# Patient Record
Sex: MALE | Race: WHITE | NOT HISPANIC OR LATINO | URBAN - METROPOLITAN AREA
[De-identification: names, ages, dates, MRNs, and addresses within clinical notes are randomized per-mention and may not be internally consistent; named-entity substitution may affect disease eponyms.]

---

## 2017-07-18 ENCOUNTER — OUTPATIENT (OUTPATIENT)
Dept: OUTPATIENT SERVICES | Facility: HOSPITAL | Age: 72
LOS: 1 days | End: 2017-07-18

## 2017-07-18 ENCOUNTER — INPATIENT (INPATIENT)
Facility: HOSPITAL | Age: 72
LOS: 1 days | Discharge: HOME | End: 2017-07-20
Payer: MEDICARE

## 2017-07-18 PROCEDURE — 71020: CPT | Mod: 26

## 2017-07-18 PROCEDURE — 99291 CRITICAL CARE FIRST HOUR: CPT

## 2017-07-19 ENCOUNTER — OUTPATIENT (OUTPATIENT)
Dept: OUTPATIENT SERVICES | Facility: HOSPITAL | Age: 72
LOS: 1 days | End: 2017-07-19

## 2017-07-20 ENCOUNTER — OUTPATIENT (OUTPATIENT)
Dept: OUTPATIENT SERVICES | Facility: HOSPITAL | Age: 72
LOS: 1 days | End: 2017-07-20

## 2017-07-20 PROCEDURE — 76700 US EXAM ABDOM COMPLETE: CPT | Mod: 26

## 2019-05-13 PROBLEM — Z00.00 ENCOUNTER FOR PREVENTIVE HEALTH EXAMINATION: Status: ACTIVE | Noted: 2019-05-13

## 2019-05-14 ENCOUNTER — APPOINTMENT (OUTPATIENT)
Dept: RADIOLOGY | Facility: CLINIC | Age: 74
End: 2019-05-14
Payer: MEDICARE

## 2019-05-14 PROCEDURE — 72040 X-RAY EXAM NECK SPINE 2-3 VW: CPT

## 2019-11-27 ENCOUNTER — APPOINTMENT (OUTPATIENT)
Dept: MRI IMAGING | Facility: CLINIC | Age: 74
End: 2019-11-27
Payer: MEDICARE

## 2019-11-27 PROCEDURE — 72148 MRI LUMBAR SPINE W/O DYE: CPT

## 2020-12-10 ENCOUNTER — APPOINTMENT (OUTPATIENT)
Dept: RADIOLOGY | Facility: CLINIC | Age: 75
End: 2020-12-10
Payer: MEDICARE

## 2020-12-10 PROCEDURE — 71046 X-RAY EXAM CHEST 2 VIEWS: CPT

## 2021-05-20 ENCOUNTER — APPOINTMENT (OUTPATIENT)
Dept: RADIOLOGY | Facility: CLINIC | Age: 76
End: 2021-05-20
Payer: MEDICARE

## 2021-05-20 PROCEDURE — 77080 DXA BONE DENSITY AXIAL: CPT

## 2021-06-04 ENCOUNTER — OUTPATIENT (OUTPATIENT)
Dept: OUTPATIENT SERVICES | Facility: HOSPITAL | Age: 76
LOS: 1 days | End: 2021-06-04
Payer: MEDICARE

## 2021-06-04 PROCEDURE — 78306 BONE IMAGING WHOLE BODY: CPT | Mod: 26

## 2021-11-15 ENCOUNTER — OUTPATIENT (OUTPATIENT)
Dept: OUTPATIENT SERVICES | Facility: HOSPITAL | Age: 76
LOS: 1 days | End: 2021-11-15

## 2022-12-15 ENCOUNTER — RX ONLY (RX ONLY)
Age: 77
End: 2022-12-15

## 2022-12-15 ENCOUNTER — OFFICE (OUTPATIENT)
Dept: URBAN - METROPOLITAN AREA CLINIC 38 | Facility: CLINIC | Age: 77
Setting detail: OPHTHALMOLOGY
End: 2022-12-15
Payer: MEDICARE

## 2022-12-15 DIAGNOSIS — H16.223: ICD-10-CM

## 2022-12-15 DIAGNOSIS — H40.1111: ICD-10-CM

## 2022-12-15 DIAGNOSIS — H02.011: ICD-10-CM

## 2022-12-15 DIAGNOSIS — H01.005: ICD-10-CM

## 2022-12-15 DIAGNOSIS — H01.004: ICD-10-CM

## 2022-12-15 DIAGNOSIS — H40.1122: ICD-10-CM

## 2022-12-15 DIAGNOSIS — H25.13: ICD-10-CM

## 2022-12-15 DIAGNOSIS — H01.001: ICD-10-CM

## 2022-12-15 DIAGNOSIS — H01.002: ICD-10-CM

## 2022-12-15 DIAGNOSIS — H02.012: ICD-10-CM

## 2022-12-15 PROCEDURE — 99213 OFFICE O/P EST LOW 20 MIN: CPT | Performed by: OPHTHALMOLOGY

## 2022-12-15 PROCEDURE — 76514 ECHO EXAM OF EYE THICKNESS: CPT | Performed by: OPHTHALMOLOGY

## 2022-12-15 PROCEDURE — 92133 CPTRZD OPH DX IMG PST SGM ON: CPT | Performed by: OPHTHALMOLOGY

## 2022-12-15 PROCEDURE — 67820 REVISE EYELASHES: CPT | Performed by: OPHTHALMOLOGY

## 2022-12-15 PROCEDURE — 92083 EXTENDED VISUAL FIELD XM: CPT | Performed by: OPHTHALMOLOGY

## 2022-12-15 ASSESSMENT — AXIALLENGTH_DERIVED
OD_AL: 23.6995
OD_AL: 23.6995
OS_AL: 23.9006
OS_AL: 23.9006
OD_AL: 23.7487
OS_AL: 23.9006

## 2022-12-15 ASSESSMENT — VISUAL ACUITY
OS_BCVA: 20/25-
OD_BCVA: 20/30-2

## 2022-12-15 ASSESSMENT — REFRACTION_AUTOREFRACTION
OS_AXIS: 069
OS_CYLINDER: -1.00
OD_SPHERE: +1.75
OD_AXIS: 096
OD_CYLINDER: -1.00
OS_SPHERE: +1.25

## 2022-12-15 ASSESSMENT — REFRACTION_MANIFEST
OS_VA2: 20/25(J1)
OS_SPHERE: +1.25
OU_VA: 20/20-1
OD_VA2: 20/25(J1)
OD_AXIS: 100
OS_SPHERE: +1.25
OD_VA1: 20/25-2
OU_VA: 20/20-1
OS_AXIS: 070
OS_CYLINDER: -1.00
OD_CYLINDER: -0.75
OD_VA2: 20/25(J1)
OS_VA1: 20/20-2
OS_VA1: 20/20-2
OS_CYLINDER: -1.00
OD_ADD: +2.50
OD_AXIS: 100
OS_ADD: +2.50
OD_ADD: +2.50
OD_VA1: 20/25-2
OS_AXIS: 070
OD_SPHERE: +1.75
OS_VA2: 20/25(J1)
OS_ADD: +2.50
OD_CYLINDER: -0.75
OD_SPHERE: +1.75

## 2022-12-15 ASSESSMENT — LID EXAM ASSESSMENTS
OD_TRICHIASIS: RLL RUL
OS_BLEPHARITIS: LLL LUL
OD_BLEPHARITIS: RLL RUL

## 2022-12-15 ASSESSMENT — REFRACTION_CURRENTRX
OS_VPRISM_DIRECTION: PROGS
OD_SPHERE: +1.75
OS_OVR_VA: 20/
OS_SPHERE: +1.25
OD_CYLINDER: -1.00
OS_AXIS: 084
OD_VPRISM_DIRECTION: PROGS
OD_AXIS: 094
OS_ADD: +2.50
OD_ADD: +2.50
OS_CYLINDER: -0.75
OD_OVR_VA: 20/

## 2022-12-15 ASSESSMENT — PACHYMETRY
OD_CT_CORRECTION: 5
OS_CT_UM: 476
OD_CT_UM: 477
OS_CT_CORRECTION: 5

## 2022-12-15 ASSESSMENT — KERATOMETRY
OD_K2POWER_DIOPTERS: 41.75
OS_AXISANGLE_DEGREES: 114
OD_AXISANGLE_DEGREES: 090
OS_K1POWER_DIOPTERS: 41.75
OD_K1POWER_DIOPTERS: 41.75
OS_K2POWER_DIOPTERS: 42.00

## 2022-12-15 ASSESSMENT — SPHEQUIV_DERIVED
OS_SPHEQUIV: 0.75
OS_SPHEQUIV: 0.75
OD_SPHEQUIV: 1.25
OD_SPHEQUIV: 1.375
OS_SPHEQUIV: 0.75
OD_SPHEQUIV: 1.375

## 2022-12-15 ASSESSMENT — DRY EYES - PHYSICIAN NOTES
OS_GENERALCOMMENTS: INFERIORLY
OD_GENERALCOMMENTS: INFERIORLY

## 2022-12-15 ASSESSMENT — CONFRONTATIONAL VISUAL FIELD TEST (CVF)
OS_FINDINGS: FULL
OD_FINDINGS: FULL

## 2022-12-15 ASSESSMENT — SUPERFICIAL PUNCTATE KERATITIS (SPK)
OS_SPK: 1+
OD_SPK: 1+

## 2022-12-15 ASSESSMENT — TONOMETRY: OD_IOP_MMHG: 19

## 2022-12-22 ENCOUNTER — OFFICE (OUTPATIENT)
Dept: URBAN - METROPOLITAN AREA CLINIC 38 | Facility: CLINIC | Age: 77
Setting detail: OPHTHALMOLOGY
End: 2022-12-22
Payer: MEDICARE

## 2022-12-22 DIAGNOSIS — H40.1122: ICD-10-CM

## 2022-12-22 DIAGNOSIS — H40.1111: ICD-10-CM

## 2022-12-22 PROBLEM — H01.001 BLEPHARITIS; RIGHT UPPER LID, RIGHT LOWER LID, LEFT UPPER LID, LEFT LOWER LID: Status: ACTIVE | Noted: 2022-12-22

## 2022-12-22 PROBLEM — H01.005 BLEPHARITIS; RIGHT UPPER LID, RIGHT LOWER LID, LEFT UPPER LID, LEFT LOWER LID: Status: ACTIVE | Noted: 2022-12-22

## 2022-12-22 PROBLEM — H01.002 BLEPHARITIS; RIGHT UPPER LID, RIGHT LOWER LID, LEFT UPPER LID, LEFT LOWER LID: Status: ACTIVE | Noted: 2022-12-22

## 2022-12-22 PROBLEM — H01.004 BLEPHARITIS; RIGHT UPPER LID, RIGHT LOWER LID, LEFT UPPER LID, LEFT LOWER LID: Status: ACTIVE | Noted: 2022-12-22

## 2022-12-22 PROBLEM — H02.011: Status: ACTIVE | Noted: 2022-12-15

## 2022-12-22 PROBLEM — H02.012: Status: ACTIVE | Noted: 2022-12-15

## 2022-12-22 PROCEDURE — 99213 OFFICE O/P EST LOW 20 MIN: CPT | Performed by: OPHTHALMOLOGY

## 2022-12-22 ASSESSMENT — REFRACTION_MANIFEST
OS_SPHERE: +1.25
OD_VA2: 20/25(J1)
OD_ADD: +2.50
OS_CYLINDER: -1.00
OS_VA2: 20/25(J1)
OS_CYLINDER: -1.00
OS_ADD: +2.50
OS_SPHERE: +1.25
OU_VA: 20/20-1
OD_VA1: 20/25-2
OD_AXIS: 100
OD_VA2: 20/25(J1)
OD_SPHERE: +1.75
OS_AXIS: 070
OS_VA1: 20/20-2
OD_AXIS: 100
OD_VA1: 20/25-2
OD_ADD: +2.50
OS_VA2: 20/25(J1)
OD_CYLINDER: -0.75
OS_ADD: +2.50
OS_AXIS: 070
OD_SPHERE: +1.75
OS_VA1: 20/20-2
OD_CYLINDER: -0.75
OU_VA: 20/20-1

## 2022-12-22 ASSESSMENT — LID EXAM ASSESSMENTS
OD_TRICHIASIS: RLL RUL
OS_BLEPHARITIS: LLL LUL
OD_BLEPHARITIS: RLL RUL

## 2022-12-22 ASSESSMENT — PACHYMETRY
OD_CT_UM: 477
OD_CT_CORRECTION: 5
OS_CT_UM: 476
OS_CT_CORRECTION: 5

## 2022-12-22 ASSESSMENT — REFRACTION_CURRENTRX
OD_VPRISM_DIRECTION: PROGS
OD_AXIS: 094
OS_SPHERE: +1.25
OD_ADD: +2.50
OS_CYLINDER: -0.75
OS_OVR_VA: 20/
OD_CYLINDER: -1.00
OS_ADD: +2.50
OD_OVR_VA: 20/
OD_SPHERE: +1.75
OS_AXIS: 084
OS_VPRISM_DIRECTION: PROGS

## 2022-12-22 ASSESSMENT — KERATOMETRY
METHOD_AUTO_MANUAL: AUTO
OD_AXISANGLE_DEGREES: 31
OS_AXISANGLE_DEGREES: 90
OS_K2POWER_DIOPTERS: 41.75
OD_K2POWER_DIOPTERS: 42.00
OS_K1POWER_DIOPTERS: 41.75
OD_K1POWER_DIOPTERS: 41.50

## 2022-12-22 ASSESSMENT — REFRACTION_AUTOREFRACTION
OD_SPHERE: +1.50
OS_CYLINDER: -0.75
OS_AXIS: 79
OS_SPHERE: +1.00
OD_CYLINDER: -1.00
OD_AXIS: 99

## 2022-12-22 ASSESSMENT — SPHEQUIV_DERIVED
OS_SPHEQUIV: 0.625
OD_SPHEQUIV: 1.375
OD_SPHEQUIV: 1
OD_SPHEQUIV: 1.375
OS_SPHEQUIV: 0.75
OS_SPHEQUIV: 0.75

## 2022-12-22 ASSESSMENT — AXIALLENGTH_DERIVED
OS_AL: 23.95
OD_AL: 23.8478
OS_AL: 23.95
OD_AL: 23.6995
OS_AL: 24
OD_AL: 23.6995

## 2022-12-22 ASSESSMENT — TONOMETRY
OD_IOP_MMHG: 12
OS_IOP_MMHG: 15

## 2022-12-22 ASSESSMENT — DRY EYES - PHYSICIAN NOTES
OD_GENERALCOMMENTS: INFERIORLY
OS_GENERALCOMMENTS: INFERIORLY

## 2022-12-22 ASSESSMENT — CONFRONTATIONAL VISUAL FIELD TEST (CVF)
OD_FINDINGS: FULL
OS_FINDINGS: FULL

## 2022-12-22 ASSESSMENT — SUPERFICIAL PUNCTATE KERATITIS (SPK)
OD_SPK: 1+
OS_SPK: 1+

## 2022-12-22 ASSESSMENT — VISUAL ACUITY
OS_BCVA: 20/25-
OD_BCVA: 20/30-2

## 2023-05-04 ENCOUNTER — RX ONLY (RX ONLY)
Age: 78
End: 2023-05-04

## 2023-05-04 ENCOUNTER — OFFICE (OUTPATIENT)
Dept: URBAN - METROPOLITAN AREA CLINIC 38 | Facility: CLINIC | Age: 78
Setting detail: OPHTHALMOLOGY
End: 2023-05-04
Payer: MEDICARE

## 2023-05-04 DIAGNOSIS — H40.1111: ICD-10-CM

## 2023-05-04 DIAGNOSIS — H02.011: ICD-10-CM

## 2023-05-04 DIAGNOSIS — H01.005: ICD-10-CM

## 2023-05-04 DIAGNOSIS — H40.1122: ICD-10-CM

## 2023-05-04 DIAGNOSIS — H25.13: ICD-10-CM

## 2023-05-04 DIAGNOSIS — H01.002: ICD-10-CM

## 2023-05-04 DIAGNOSIS — H02.012: ICD-10-CM

## 2023-05-04 DIAGNOSIS — H01.001: ICD-10-CM

## 2023-05-04 DIAGNOSIS — H16.223: ICD-10-CM

## 2023-05-04 DIAGNOSIS — H01.004: ICD-10-CM

## 2023-05-04 PROCEDURE — 92133 CPTRZD OPH DX IMG PST SGM ON: CPT | Performed by: OPHTHALMOLOGY

## 2023-05-04 PROCEDURE — 99213 OFFICE O/P EST LOW 20 MIN: CPT | Performed by: OPHTHALMOLOGY

## 2023-05-04 ASSESSMENT — REFRACTION_CURRENTRX
OD_CYLINDER: -1.00
OD_OVR_VA: 20/
OS_AXIS: 084
OS_CYLINDER: -0.75
OD_AXIS: 094
OS_ADD: +2.50
OS_OVR_VA: 20/
OD_SPHERE: +1.75
OS_VPRISM_DIRECTION: PROGS
OD_ADD: +2.50
OS_SPHERE: +1.25
OD_VPRISM_DIRECTION: PROGS

## 2023-05-04 ASSESSMENT — REFRACTION_MANIFEST
OD_VA1: 20/25-2
OD_CYLINDER: -0.75
OD_SPHERE: +1.25
OS_SPHERE: +1.25
OS_SPHERE: +0.50
OD_ADD: +2.75
OS_VA1: 20/25
OD_AXIS: 095
OU_VA: 20/20-1
OD_VA2: 20/25(J1)
OD_ADD: +2.50
OS_AXIS: 070
OD_VA2: 20/25(J1)
OD_SPHERE: +1.75
OS_AXIS: 070
OS_VA2: 20/25(J1)
OD_AXIS: 100
OD_VA1: 20/25-2
OD_CYLINDER: +1.00
OS_CYLINDER: -1.00
OS_ADD: +2.50
OS_ADD: +2.75
OU_VA: 20/20-1
OS_VA1: 20/20-2
OS_CYLINDER: -1.00
OS_VA2: 20/25(J1)

## 2023-05-04 ASSESSMENT — KERATOMETRY
METHOD_AUTO_MANUAL: AUTO
OD_K2POWER_DIOPTERS: 42.00
OD_AXISANGLE_DEGREES: 090
OS_AXISANGLE_DEGREES: 124
OS_K1POWER_DIOPTERS: 41.75
OS_K2POWER_DIOPTERS: 42.00
OD_K1POWER_DIOPTERS: 42.00

## 2023-05-04 ASSESSMENT — AXIALLENGTH_DERIVED
OD_AL: 23.4618
OD_AL: 23.7544
OS_AL: 24.2043
OS_AL: 23.9006
OS_AL: 24.1022
OD_AL: 23.6072

## 2023-05-04 ASSESSMENT — TONOMETRY
OS_IOP_MMHG: 15
OD_IOP_MMHG: 15

## 2023-05-04 ASSESSMENT — REFRACTION_AUTOREFRACTION
OD_SPHERE: +1.75
OS_AXIS: 067
OS_CYLINDER: -1.00
OD_CYLINDER: -1.50
OS_SPHERE: +0.75
OD_AXIS: 095

## 2023-05-04 ASSESSMENT — CONFRONTATIONAL VISUAL FIELD TEST (CVF)
OD_FINDINGS: FULL
OS_FINDINGS: FULL

## 2023-05-04 ASSESSMENT — SPHEQUIV_DERIVED
OS_SPHEQUIV: 0
OS_SPHEQUIV: 0.25
OS_SPHEQUIV: 0.75
OD_SPHEQUIV: 1.375
OD_SPHEQUIV: 1
OD_SPHEQUIV: 1.75

## 2023-05-04 ASSESSMENT — PACHYMETRY
OS_CT_CORRECTION: 5
OD_CT_UM: 477
OD_CT_CORRECTION: 5
OS_CT_UM: 476

## 2023-05-04 ASSESSMENT — VISUAL ACUITY
OS_BCVA: 20/25-1
OD_BCVA: 20/40

## 2023-05-04 ASSESSMENT — LID EXAM ASSESSMENTS
OS_BLEPHARITIS: LLL LUL T
OD_TRICHIASIS: RLL RUL
OD_BLEPHARITIS: RLL RUL T

## 2023-05-04 ASSESSMENT — SUPERFICIAL PUNCTATE KERATITIS (SPK)
OS_SPK: T
OD_SPK: T

## 2023-05-04 ASSESSMENT — TEAR BREAK UP TIME (TBUT)
OS_TBUT: 6-8 SEC
OD_TBUT: 6-8 SEC

## 2023-05-22 ENCOUNTER — APPOINTMENT (OUTPATIENT)
Dept: ORTHOPEDIC SURGERY | Facility: CLINIC | Age: 78
End: 2023-05-22
Payer: MEDICARE

## 2023-05-22 VITALS — BODY MASS INDEX: 25.9 KG/M2 | WEIGHT: 185 LBS | HEIGHT: 71 IN

## 2023-05-22 DIAGNOSIS — E78.00 PURE HYPERCHOLESTEROLEMIA, UNSPECIFIED: ICD-10-CM

## 2023-05-22 DIAGNOSIS — M51.37 OTHER INTERVERTEBRAL DISC DEGENERATION, LUMBOSACRAL REGION: ICD-10-CM

## 2023-05-22 DIAGNOSIS — Z85.46 PERSONAL HISTORY OF MALIGNANT NEOPLASM OF PROSTATE: ICD-10-CM

## 2023-05-22 DIAGNOSIS — Z78.9 OTHER SPECIFIED HEALTH STATUS: ICD-10-CM

## 2023-05-22 DIAGNOSIS — M41.25 OTHER IDIOPATHIC SCOLIOSIS, THORACOLUMBAR REGION: ICD-10-CM

## 2023-05-22 DIAGNOSIS — I10 ESSENTIAL (PRIMARY) HYPERTENSION: ICD-10-CM

## 2023-05-22 DIAGNOSIS — M51.36 OTHER INTERVERTEBRAL DISC DEGENERATION, LUMBAR REGION: ICD-10-CM

## 2023-05-22 DIAGNOSIS — H57.9 UNSPECIFIED DISORDER OF EYE AND ADNEXA: ICD-10-CM

## 2023-05-22 DIAGNOSIS — Z87.39 PERSONAL HISTORY OF OTHER DISEASES OF THE MUSCULOSKELETAL SYSTEM AND CONNECTIVE TISSUE: ICD-10-CM

## 2023-05-22 DIAGNOSIS — M47.817 SPONDYLOSIS W/OUT MYELOPATHY OR RADICULOPATHY, LUMBOSACRAL REGION: ICD-10-CM

## 2023-05-22 DIAGNOSIS — M48.061 SPINAL STENOSIS, LUMBAR REGION WITHOUT NEUROGENIC CLAUDICATION: ICD-10-CM

## 2023-05-22 PROCEDURE — 99204 OFFICE O/P NEW MOD 45 MIN: CPT

## 2023-05-22 PROCEDURE — 72100 X-RAY EXAM L-S SPINE 2/3 VWS: CPT

## 2023-05-22 RX ORDER — SIMVASTATIN 20 MG/1
20 TABLET, FILM COATED ORAL
Refills: 0 | Status: ACTIVE | COMMUNITY

## 2023-05-22 RX ORDER — ASPIRIN 81 MG
81 TABLET, DELAYED RELEASE (ENTERIC COATED) ORAL
Refills: 0 | Status: ACTIVE | COMMUNITY

## 2023-05-22 RX ORDER — ENALAPRIL MALEATE 20 MG/1
20 TABLET ORAL
Refills: 0 | Status: ACTIVE | COMMUNITY

## 2023-05-22 RX ORDER — METOPROLOL TARTRATE 100 MG/1
100 TABLET, FILM COATED ORAL
Refills: 0 | Status: ACTIVE | COMMUNITY

## 2023-05-22 RX ORDER — AMLODIPINE BESYLATE 5 MG/1
5 TABLET ORAL
Refills: 0 | Status: ACTIVE | COMMUNITY

## 2023-05-22 RX ORDER — MELOXICAM 15 MG/1
15 TABLET ORAL
Refills: 0 | Status: ACTIVE | COMMUNITY

## 2023-05-22 RX ORDER — LATANOPROST/PF 0.005 %
0.01 DROPS OPHTHALMIC (EYE)
Refills: 0 | Status: ACTIVE | COMMUNITY

## 2023-05-22 NOTE — HISTORY OF PRESENT ILLNESS
[Mid-back] : mid-back [Gradual] : gradual [7] : 7 [2] : 2 [Dull/Aching] : dull/aching [Sharp] : sharp [Intermittent] : intermittent [Nothing helps with pain getting better] : Nothing helps with pain getting better [Bending forward] : bending forward [Retired] : Work status: retired [de-identified] : Patient presents today with middle back pain for years with NKI. Previously completed PT ~ 40 years ago. States his pain is localized to the left side. Admits to taking Aleve PRN for pain. Admits to taking Meloxicam for his left knee. Had lumbar spine xray in [place], has report but no disc. \par \par Patient is scheduled for L TKA 8/7/23. [] : no [de-identified] : gardening, lifting

## 2023-05-22 NOTE — IMAGING
[Facet arthropathy] : Facet arthropathy [Disc space narrowing] : Disc space narrowing [Scoliosis] : Scoliosis

## 2023-06-08 ENCOUNTER — APPOINTMENT (OUTPATIENT)
Dept: MRI IMAGING | Facility: CLINIC | Age: 78
End: 2023-06-08

## 2023-06-19 ENCOUNTER — APPOINTMENT (OUTPATIENT)
Dept: ORTHOPEDIC SURGERY | Facility: CLINIC | Age: 78
End: 2023-06-19

## 2023-11-09 ENCOUNTER — OFFICE (OUTPATIENT)
Dept: URBAN - METROPOLITAN AREA CLINIC 38 | Facility: CLINIC | Age: 78
Setting detail: OPHTHALMOLOGY
End: 2023-11-09
Payer: MEDICARE

## 2023-11-09 DIAGNOSIS — H01.001: ICD-10-CM

## 2023-11-09 DIAGNOSIS — H02.011: ICD-10-CM

## 2023-11-09 DIAGNOSIS — H40.1122: ICD-10-CM

## 2023-11-09 DIAGNOSIS — H01.005: ICD-10-CM

## 2023-11-09 DIAGNOSIS — H25.13: ICD-10-CM

## 2023-11-09 DIAGNOSIS — H01.004: ICD-10-CM

## 2023-11-09 DIAGNOSIS — H16.223: ICD-10-CM

## 2023-11-09 DIAGNOSIS — H01.002: ICD-10-CM

## 2023-11-09 DIAGNOSIS — H40.1111: ICD-10-CM

## 2023-11-09 DIAGNOSIS — H02.012: ICD-10-CM

## 2023-11-09 PROCEDURE — 92133 CPTRZD OPH DX IMG PST SGM ON: CPT | Performed by: OPHTHALMOLOGY

## 2023-11-09 PROCEDURE — 99213 OFFICE O/P EST LOW 20 MIN: CPT | Performed by: OPHTHALMOLOGY

## 2023-11-09 ASSESSMENT — LID EXAM ASSESSMENTS
OS_TRICHIASIS: ABSENT
OS_BLEPHARITIS: LLL LUL T
OD_TRICHIASIS: ABSENT
OD_BLEPHARITIS: RLL RUL T

## 2023-11-09 ASSESSMENT — SUPERFICIAL PUNCTATE KERATITIS (SPK)
OS_SPK: T
OD_SPK: T

## 2023-11-09 ASSESSMENT — TEAR BREAK UP TIME (TBUT)
OD_TBUT: 6-8 SEC
OS_TBUT: 6-8 SEC

## 2023-11-09 ASSESSMENT — CONFRONTATIONAL VISUAL FIELD TEST (CVF)
OD_FINDINGS: FULL
OS_FINDINGS: FULL

## 2023-11-14 PROBLEM — H01.001 BLEPHARITIS; RIGHT UPPER LID, RIGHT LOWER LID, LEFT UPPER LID, LEFT LOWER LID: Status: ACTIVE | Noted: 2023-11-09

## 2023-11-14 PROBLEM — H01.004 BLEPHARITIS; RIGHT UPPER LID, RIGHT LOWER LID, LEFT UPPER LID, LEFT LOWER LID: Status: ACTIVE | Noted: 2023-11-09

## 2023-11-14 PROBLEM — H01.005 BLEPHARITIS; RIGHT UPPER LID, RIGHT LOWER LID, LEFT UPPER LID, LEFT LOWER LID: Status: ACTIVE | Noted: 2023-11-09

## 2023-11-14 PROBLEM — H01.002 BLEPHARITIS; RIGHT UPPER LID, RIGHT LOWER LID, LEFT UPPER LID, LEFT LOWER LID: Status: ACTIVE | Noted: 2023-11-09

## 2023-11-14 ASSESSMENT — REFRACTION_MANIFEST
OS_VA2: 20/25(J1)
OS_CYLINDER: -1.00
OS_VA1: 20/20-2
OD_SPHERE: +1.25
OS_SPHERE: +1.25
OS_AXIS: 070
OS_CYLINDER: -1.00
OD_VA2: 20/25(J1)
OU_VA: 20/20-1
OD_CYLINDER: -0.75
OS_ADD: +2.75
OS_ADD: +2.50
OD_VA1: 20/25-2
OD_SPHERE: +1.75
OD_ADD: +2.75
OS_VA1: 20/25
OD_CYLINDER: +1.00
OD_AXIS: 095
OS_AXIS: 070
OD_ADD: +2.50
OS_SPHERE: +0.50
OD_VA2: 20/25(J1)
OS_VA2: 20/25(J1)
OU_VA: 20/20-1
OD_AXIS: 100
OD_VA1: 20/25-2

## 2023-11-14 ASSESSMENT — REFRACTION_CURRENTRX
OD_AXIS: 094
OS_SPHERE: +1.25
OD_ADD: +2.50
OS_AXIS: 084
OS_ADD: +2.50
OD_SPHERE: +1.75
OD_CYLINDER: -1.00
OS_VPRISM_DIRECTION: PROGS
OS_CYLINDER: -0.75
OD_OVR_VA: 20/
OS_OVR_VA: 20/
OD_VPRISM_DIRECTION: PROGS

## 2023-11-14 ASSESSMENT — SPHEQUIV_DERIVED
OS_SPHEQUIV: 0.75
OD_SPHEQUIV: 1
OD_SPHEQUIV: 1.75
OS_SPHEQUIV: 0
OS_SPHEQUIV: 0.25
OD_SPHEQUIV: 1.375

## 2023-11-14 ASSESSMENT — REFRACTION_AUTOREFRACTION
OS_SPHERE: +0.75
OD_AXIS: 095
OS_CYLINDER: -1.00
OD_SPHERE: +1.75
OS_AXIS: 067
OD_CYLINDER: -1.50

## 2024-02-23 DIAGNOSIS — M88.9 OSTEITIS DEFORMANS OF UNSPECIFIED BONE: ICD-10-CM

## 2024-02-23 DIAGNOSIS — M81.0 AGE-RELATED OSTEOPOROSIS W/OUT CURRENT PATHOLOGICAL FRACTURE: ICD-10-CM

## 2024-05-09 ENCOUNTER — OFFICE (OUTPATIENT)
Dept: URBAN - METROPOLITAN AREA CLINIC 38 | Facility: CLINIC | Age: 79
Setting detail: OPHTHALMOLOGY
End: 2024-05-09
Payer: MEDICARE

## 2024-05-09 DIAGNOSIS — H25.13: ICD-10-CM

## 2024-05-09 DIAGNOSIS — H02.011: ICD-10-CM

## 2024-05-09 DIAGNOSIS — H16.223: ICD-10-CM

## 2024-05-09 DIAGNOSIS — H01.001: ICD-10-CM

## 2024-05-09 DIAGNOSIS — H01.005: ICD-10-CM

## 2024-05-09 DIAGNOSIS — H40.1111: ICD-10-CM

## 2024-05-09 DIAGNOSIS — H01.002: ICD-10-CM

## 2024-05-09 DIAGNOSIS — H02.012: ICD-10-CM

## 2024-05-09 DIAGNOSIS — H01.004: ICD-10-CM

## 2024-05-09 DIAGNOSIS — H40.1122: ICD-10-CM

## 2024-05-09 PROCEDURE — 92133 CPTRZD OPH DX IMG PST SGM ON: CPT | Performed by: OPHTHALMOLOGY

## 2024-05-09 PROCEDURE — 92014 COMPRE OPH EXAM EST PT 1/>: CPT | Performed by: OPHTHALMOLOGY

## 2024-05-09 ASSESSMENT — LID EXAM ASSESSMENTS
OS_TRICHIASIS: ABSENT
OD_BLEPHARITIS: RLL RUL T
OD_TRICHIASIS: T
OS_BLEPHARITIS: LLL LUL T

## 2024-05-09 ASSESSMENT — CONFRONTATIONAL VISUAL FIELD TEST (CVF)
OD_FINDINGS: FULL
OS_FINDINGS: FULL

## 2024-05-10 PROBLEM — H01.001 BLEPHARITIS; RIGHT UPPER LID, LEFT UPPER LID: Status: ACTIVE | Noted: 2024-05-09

## 2024-05-10 PROBLEM — H01.004 BLEPHARITIS; RIGHT UPPER LID, LEFT UPPER LID: Status: ACTIVE | Noted: 2024-05-09

## 2024-10-17 ENCOUNTER — APPOINTMENT (OUTPATIENT)
Dept: ENDOCRINOLOGY | Facility: CLINIC | Age: 79
End: 2024-10-17
Payer: MEDICARE

## 2024-10-17 VITALS
DIASTOLIC BLOOD PRESSURE: 72 MMHG | HEIGHT: 71 IN | SYSTOLIC BLOOD PRESSURE: 126 MMHG | WEIGHT: 182 LBS | HEART RATE: 67 BPM | TEMPERATURE: 98 F | OXYGEN SATURATION: 97 % | BODY MASS INDEX: 25.48 KG/M2

## 2024-10-17 DIAGNOSIS — M88.9 OSTEITIS DEFORMANS OF UNSPECIFIED BONE: ICD-10-CM

## 2024-10-17 DIAGNOSIS — M81.0 AGE-RELATED OSTEOPOROSIS W/OUT CURRENT PATHOLOGICAL FRACTURE: ICD-10-CM

## 2024-10-17 DIAGNOSIS — M51.369: ICD-10-CM

## 2024-10-17 DIAGNOSIS — M48.061 SPINAL STENOSIS, LUMBAR REGION WITHOUT NEUROGENIC CLAUDICATION: ICD-10-CM

## 2024-10-17 PROCEDURE — 99204 OFFICE O/P NEW MOD 45 MIN: CPT

## 2024-10-17 PROCEDURE — 99214 OFFICE O/P EST MOD 30 MIN: CPT

## 2024-10-17 PROCEDURE — G2211 COMPLEX E/M VISIT ADD ON: CPT

## 2024-10-24 ENCOUNTER — APPOINTMENT (OUTPATIENT)
Dept: RADIOLOGY | Facility: CLINIC | Age: 79
End: 2024-10-24
Payer: MEDICARE

## 2024-10-24 PROCEDURE — 77080 DXA BONE DENSITY AXIAL: CPT

## 2024-11-21 ENCOUNTER — OFFICE (OUTPATIENT)
Dept: URBAN - METROPOLITAN AREA CLINIC 38 | Facility: CLINIC | Age: 79
Setting detail: OPHTHALMOLOGY
End: 2024-11-21
Payer: MEDICARE

## 2024-11-21 DIAGNOSIS — H35.372: ICD-10-CM

## 2024-11-21 DIAGNOSIS — H40.1111: ICD-10-CM

## 2024-11-21 DIAGNOSIS — H25.13: ICD-10-CM

## 2024-11-21 DIAGNOSIS — H40.1122: ICD-10-CM

## 2024-11-21 PROCEDURE — 92083 EXTENDED VISUAL FIELD XM: CPT | Performed by: OPHTHALMOLOGY

## 2024-11-21 PROCEDURE — 92014 COMPRE OPH EXAM EST PT 1/>: CPT | Performed by: OPHTHALMOLOGY

## 2024-11-21 PROCEDURE — 92134 CPTRZ OPH DX IMG PST SGM RTA: CPT | Performed by: OPHTHALMOLOGY

## 2024-11-21 ASSESSMENT — REFRACTION_MANIFEST
OS_AXIS: 065
OD_AXIS: 090
OD_CYLINDER: -1.00
OU_VA: 20/20-1
OD_SPHERE: +1.25
OD_AXIS: 095
OS_ADD: +2.50
OD_CYLINDER: +1.00
OS_VA1: 20/125
OS_CYLINDER: -1.00
OS_AXIS: 070
OS_VA2: 20/25(J1)
OS_VA1: 20/25
OS_SPHERE: +0.50
OS_VA2: 20/25(J1)
OS_CYLINDER: -1.00
OS_SPHERE: +0.75
OD_VA2: 20/25(J1)
OD_ADD: +2.50
OD_VA1: 20/30-2
OD_VA2: 20/25(J1)
OD_SPHERE: +1.25
OS_ADD: +2.75
OU_VA: 20/30-
OD_ADD: +2.75
OD_VA1: 20/25-2

## 2024-11-21 ASSESSMENT — TONOMETRY
OS_IOP_MMHG: 21
OD_IOP_MMHG: 16

## 2024-11-21 ASSESSMENT — REFRACTION_CURRENTRX
OD_CYLINDER: -1.00
OD_AXIS: 090
OS_ADD: +2.50
OS_CYLINDER: -1.00
OS_SPHERE: +0.75
OS_OVR_VA: 20/
OD_VPRISM_DIRECTION: PROGS
OD_OVR_VA: 20/
OS_AXIS: 063
OS_VPRISM_DIRECTION: PROGS
OD_ADD: +2.50
OD_SPHERE: +1.25

## 2024-11-21 ASSESSMENT — KERATOMETRY
OD_AXISANGLE_DEGREES: 090
METHOD_AUTO_MANUAL: AUTO
OS_AXISANGLE_DEGREES: 124
OD_K2POWER_DIOPTERS: 42.00
OD_K1POWER_DIOPTERS: 42.00
OS_K2POWER_DIOPTERS: 42.00
OS_K1POWER_DIOPTERS: 41.75

## 2024-11-21 ASSESSMENT — CONFRONTATIONAL VISUAL FIELD TEST (CVF)
OS_FINDINGS: FULL
OD_FINDINGS: FULL

## 2024-11-21 ASSESSMENT — REFRACTION_AUTOREFRACTION
OS_CYLINDER: -1.00
OD_CYLINDER: -1.50
OS_SPHERE: +0.75
OD_SPHERE: +1.75
OD_AXIS: 095
OS_AXIS: 067

## 2024-11-21 ASSESSMENT — PACHYMETRY
OD_CT_UM: 477
OS_CT_CORRECTION: 5
OS_CT_UM: 476
OD_CT_CORRECTION: 5

## 2024-11-21 ASSESSMENT — VISUAL ACUITY
OD_BCVA: 20/125
OS_BCVA: 20/30-2

## 2025-05-01 ENCOUNTER — OFFICE (OUTPATIENT)
Dept: URBAN - METROPOLITAN AREA CLINIC 38 | Facility: CLINIC | Age: 80
Setting detail: OPHTHALMOLOGY
End: 2025-05-01
Payer: MEDICARE

## 2025-05-01 DIAGNOSIS — H25.13: ICD-10-CM

## 2025-05-01 DIAGNOSIS — H25.12: ICD-10-CM

## 2025-05-01 PROCEDURE — 99213 OFFICE O/P EST LOW 20 MIN: CPT | Performed by: OPHTHALMOLOGY

## 2025-05-01 PROCEDURE — 92136 OPHTHALMIC BIOMETRY: CPT | Mod: 26,LT | Performed by: OPHTHALMOLOGY

## 2025-05-01 PROCEDURE — 92136 OPHTHALMIC BIOMETRY: CPT | Mod: TC | Performed by: OPHTHALMOLOGY

## 2025-05-01 ASSESSMENT — KERATOMETRY
OD_K1POWER_DIOPTERS: 41.50
OS_K2POWER_DIOPTERS: 41.75
OS_AXISANGLE_DEGREES: 118
METHOD_AUTO_MANUAL: AUTO
OD_K2POWER_DIOPTERS: 42.25
OS_K1POWER_DIOPTERS: 41.50
OD_AXISANGLE_DEGREES: 004

## 2025-05-01 ASSESSMENT — REFRACTION_CURRENTRX
OS_VPRISM_DIRECTION: PROGS
OD_SPHERE: +1.25
OS_AXIS: 063
OS_CYLINDER: -1.00
OS_SPHERE: +0.75
OD_OVR_VA: 20/
OS_ADD: +2.50
OD_AXIS: 090
OS_OVR_VA: 20/
OD_ADD: +2.50
OD_VPRISM_DIRECTION: PROGS
OD_CYLINDER: -1.00

## 2025-05-01 ASSESSMENT — CONFRONTATIONAL VISUAL FIELD TEST (CVF)
OD_FINDINGS: FULL
OS_FINDINGS: FULL

## 2025-05-01 ASSESSMENT — PACHYMETRY
OS_CT_CORRECTION: 5
OS_CT_UM: 476
OD_CT_UM: 477
OD_CT_CORRECTION: 5

## 2025-05-01 ASSESSMENT — REFRACTION_MANIFEST
OD_SPHERE: +1.25
OS_VA2: 20/25(J1)
OD_CYLINDER: +1.00
OD_VA1: 20/40
OD_VA2: 20/25(J1)
OS_SPHERE: +0.50
OU_VA: 20/20-1
OS_SPHERE: +0.75
OS_AXIS: 065
OS_VA1: 20/25
OD_ADD: +2.75
OD_VA1: 20/25-2
OU_VA: 20/30-
OD_CYLINDER: -1.00
OS_ADD: +2.75
OD_ADD: +2.50
OS_VA2: 20/25(J1)
OD_VA2: 20/25(J1)
OS_ADD: +2.50
OS_AXIS: 070
OD_AXIS: 090
OS_CYLINDER: -1.00
OD_AXIS: 095
OS_VA1: 20/125
OS_CYLINDER: -1.00
OD_SPHERE: +1.25

## 2025-05-01 ASSESSMENT — VISUAL ACUITY
OD_BCVA: 20/125
OS_BCVA: 20/40

## 2025-05-01 ASSESSMENT — REFRACTION_AUTOREFRACTION
OD_SPHERE: +1.00
OS_CYLINDER: -1.25
OS_AXIS: 070
OD_AXIS: 101
OD_CYLINDER: -1.50
OS_SPHERE: -0.75

## 2025-05-01 ASSESSMENT — TONOMETRY
OS_IOP_MMHG: 19
OD_IOP_MMHG: 14

## 2025-05-27 ENCOUNTER — AMBULATORY SURGERY CENTER (OUTPATIENT)
Dept: URBAN - METROPOLITAN AREA SURGERY 4 | Facility: SURGERY | Age: 80
Setting detail: OPHTHALMOLOGY
End: 2025-05-27
Payer: MEDICARE

## 2025-05-27 DIAGNOSIS — H52.222: ICD-10-CM

## 2025-05-27 DIAGNOSIS — H40.1121: ICD-10-CM

## 2025-05-27 DIAGNOSIS — H25.12: ICD-10-CM

## 2025-05-27 PROCEDURE — S9986 NOT MEDICALLY NECESSARY SVC: HCPCS | Mod: GX,GY | Performed by: OPHTHALMOLOGY

## 2025-05-27 PROCEDURE — 66991 XCAPSL CTRC RMVL INSJ 1+: CPT | Mod: LT | Performed by: OPHTHALMOLOGY

## 2025-05-27 PROCEDURE — FEMTO PRECISION LASER CATARACT SURGERY: Mod: GY | Performed by: OPHTHALMOLOGY

## 2025-05-28 ENCOUNTER — RX ONLY (RX ONLY)
Age: 80
End: 2025-05-28

## 2025-05-28 ENCOUNTER — OFFICE (OUTPATIENT)
Dept: URBAN - METROPOLITAN AREA CLINIC 38 | Facility: CLINIC | Age: 80
Setting detail: OPHTHALMOLOGY
End: 2025-05-28
Payer: MEDICARE

## 2025-05-28 DIAGNOSIS — H25.12: ICD-10-CM

## 2025-05-28 PROCEDURE — 99024 POSTOP FOLLOW-UP VISIT: CPT | Performed by: OPHTHALMOLOGY

## 2025-05-28 ASSESSMENT — REFRACTION_MANIFEST
OS_VA1: 20/25
OD_CYLINDER: +1.00
OS_VA2: 20/25(J1)
OD_CYLINDER: -1.00
OD_ADD: +2.50
OS_ADD: +2.50
OS_VA1: 20/125
OD_SPHERE: +1.25
OS_SPHERE: +0.50
OD_SPHERE: +1.25
OD_AXIS: 095
OS_VA2: 20/25(J1)
OU_VA: 20/30-
OS_CYLINDER: -1.00
OD_VA1: 20/40
OD_ADD: +2.75
OS_AXIS: 065
OD_VA2: 20/25(J1)
OS_SPHERE: +0.75
OD_AXIS: 090
OS_AXIS: 070
OD_VA1: 20/25-2
OS_ADD: +2.75
OD_VA2: 20/25(J1)
OU_VA: 20/20-1
OS_CYLINDER: -1.00

## 2025-05-28 ASSESSMENT — PACHYMETRY
OS_CT_CORRECTION: 5
OD_CT_UM: 477
OD_CT_CORRECTION: 5
OS_CT_UM: 476

## 2025-05-28 ASSESSMENT — REFRACTION_AUTOREFRACTION
OD_CYLINDER: -1.50
OS_AXIS: 105
OS_CYLINDER: -0.75
OS_SPHERE: +1.25
OD_SPHERE: +1.00
OD_AXIS: 101

## 2025-05-28 ASSESSMENT — REFRACTION_CURRENTRX
OD_SPHERE: +1.25
OD_OVR_VA: 20/
OD_CYLINDER: -1.00
OS_AXIS: 063
OS_VPRISM_DIRECTION: PROGS
OS_OVR_VA: 20/
OD_AXIS: 090
OS_ADD: +2.50
OD_ADD: +2.50
OS_CYLINDER: -1.00
OD_VPRISM_DIRECTION: PROGS
OS_SPHERE: +0.75

## 2025-05-28 ASSESSMENT — VISUAL ACUITY
OD_BCVA: 20/40-1
OS_BCVA: 20/25

## 2025-05-28 ASSESSMENT — TONOMETRY: OD_IOP_MMHG: 12

## 2025-05-28 ASSESSMENT — CONFRONTATIONAL VISUAL FIELD TEST (CVF)
OS_FINDINGS: FULL
OD_FINDINGS: FULL

## 2025-05-28 ASSESSMENT — KERATOMETRY
OD_AXISANGLE_DEGREES: 025
METHOD_AUTO_MANUAL: AUTO
OD_K2POWER_DIOPTERS: 42.25
OS_K2POWER_DIOPTERS: 41.50
OD_K1POWER_DIOPTERS: 41.25
OS_AXISANGLE_DEGREES: 090
OS_K1POWER_DIOPTERS: 41.50

## 2025-05-28 ASSESSMENT — CORNEAL EDEMA CLINICAL DESCRIPTION: OS_CORNEALEDEMA: T @ INCISION

## 2025-05-29 ENCOUNTER — OFFICE (OUTPATIENT)
Dept: URBAN - METROPOLITAN AREA CLINIC 38 | Facility: CLINIC | Age: 80
Setting detail: OPHTHALMOLOGY
End: 2025-05-29
Payer: MEDICARE

## 2025-05-29 DIAGNOSIS — H25.11: ICD-10-CM

## 2025-05-29 PROBLEM — Z96.1 PSEUDOPHAKIA: Status: ACTIVE | Noted: 2025-05-29

## 2025-05-29 PROCEDURE — 92136 OPHTHALMIC BIOMETRY: CPT | Mod: 26,RT | Performed by: OPHTHALMOLOGY

## 2025-05-29 ASSESSMENT — REFRACTION_AUTOREFRACTION
OS_CYLINDER: -0.50
OD_CYLINDER: -1.75
OD_SPHERE: +1.00
OS_AXIS: 107
OS_SPHERE: +1.00
OD_AXIS: 100

## 2025-05-29 ASSESSMENT — PACHYMETRY
OS_CT_UM: 476
OS_CT_CORRECTION: 5
OD_CT_CORRECTION: 5
OD_CT_UM: 477

## 2025-05-29 ASSESSMENT — KERATOMETRY
OD_K1POWER_DIOPTERS: 42.50
OS_AXISANGLE_DEGREES: 090
OS_K1POWER_DIOPTERS: 41.75
OD_AXISANGLE_DEGREES: 090
OD_K2POWER_DIOPTERS: 42.50
OS_K2POWER_DIOPTERS: 41.75

## 2025-05-29 ASSESSMENT — REFRACTION_MANIFEST
OD_AXIS: 090
OS_CYLINDER: -1.00
OD_CYLINDER: +1.00
OS_AXIS: 065
OS_CYLINDER: -1.00
OS_ADD: +2.50
OD_AXIS: 095
OD_VA1: 20/25-2
OD_CYLINDER: -1.00
OS_SPHERE: +0.75
OD_SPHERE: +1.25
OS_VA2: 20/25(J1)
OU_VA: 20/30-
OD_ADD: +2.50
OU_VA: 20/20-1
OS_ADD: +2.75
OS_SPHERE: +0.50
OS_VA2: 20/25(J1)
OD_VA2: 20/25(J1)
OD_ADD: +2.75
OD_VA2: 20/25(J1)
OS_VA1: 20/125
OD_SPHERE: +1.25
OS_AXIS: 070
OS_VA1: 20/25
OD_VA1: 20/40

## 2025-05-29 ASSESSMENT — REFRACTION_CURRENTRX
OS_VPRISM_DIRECTION: PROGS
OD_AXIS: 090
OD_OVR_VA: 20/
OD_CYLINDER: -1.00
OS_ADD: +2.50
OD_VPRISM_DIRECTION: PROGS
OS_AXIS: 063
OS_CYLINDER: -1.00
OS_OVR_VA: 20/
OD_SPHERE: +1.25
OD_ADD: +2.50
OS_SPHERE: +0.75

## 2025-05-29 ASSESSMENT — TONOMETRY
OD_IOP_MMHG: 15
OS_IOP_MMHG: 14

## 2025-05-29 ASSESSMENT — CONFRONTATIONAL VISUAL FIELD TEST (CVF)
OD_FINDINGS: FULL
OS_FINDINGS: FULL

## 2025-05-29 ASSESSMENT — VISUAL ACUITY
OD_BCVA: 20/40+2
OS_BCVA: 20/25

## 2025-05-29 ASSESSMENT — CORNEAL EDEMA CLINICAL DESCRIPTION: OS_CORNEALEDEMA: T @ INCISION

## 2025-06-10 ENCOUNTER — AMBULATORY SURGERY CENTER (OUTPATIENT)
Dept: URBAN - METROPOLITAN AREA SURGERY 4 | Facility: SURGERY | Age: 80
Setting detail: OPHTHALMOLOGY
End: 2025-06-10
Payer: MEDICARE

## 2025-06-10 DIAGNOSIS — H40.1111: ICD-10-CM

## 2025-06-10 DIAGNOSIS — H25.11: ICD-10-CM

## 2025-06-10 DIAGNOSIS — H52.221: ICD-10-CM

## 2025-06-10 PROCEDURE — 66991 XCAPSL CTRC RMVL INSJ 1+: CPT | Mod: 79,RT | Performed by: OPHTHALMOLOGY

## 2025-06-10 PROCEDURE — FEMTO PRECISION LASER CATARACT SURGERY: Mod: GY | Performed by: OPHTHALMOLOGY

## 2025-06-10 PROCEDURE — S9986 NOT MEDICALLY NECESSARY SVC: HCPCS | Mod: GX,GY | Performed by: OPHTHALMOLOGY

## 2025-06-11 ENCOUNTER — RX ONLY (RX ONLY)
Age: 80
End: 2025-06-11

## 2025-06-11 ENCOUNTER — OFFICE (OUTPATIENT)
Dept: URBAN - METROPOLITAN AREA CLINIC 38 | Facility: CLINIC | Age: 80
Setting detail: OPHTHALMOLOGY
End: 2025-06-11
Payer: MEDICARE

## 2025-06-11 DIAGNOSIS — Z96.1: ICD-10-CM

## 2025-06-11 PROBLEM — H11.31 SUBCONJUNCTIVAL HEMORRHAGE ; RIGHT EYE: Status: ACTIVE | Noted: 2025-06-11

## 2025-06-11 PROCEDURE — 99024 POSTOP FOLLOW-UP VISIT: CPT

## 2025-06-11 ASSESSMENT — REFRACTION_CURRENTRX
OD_VPRISM_DIRECTION: PROGS
OD_SPHERE: +1.25
OD_ADD: +2.50
OS_CYLINDER: -1.00
OD_CYLINDER: -1.00
OS_SPHERE: +0.75
OS_AXIS: 063
OD_AXIS: 090
OS_VPRISM_DIRECTION: PROGS
OS_OVR_VA: 20/
OS_ADD: +2.50
OD_OVR_VA: 20/

## 2025-06-11 ASSESSMENT — REFRACTION_MANIFEST
OS_VA1: 20/25
OS_CYLINDER: -1.00
OD_VA2: 20/25(J1)
OS_AXIS: 070
OD_SPHERE: +1.25
OS_CYLINDER: -1.00
OD_ADD: +2.50
OD_VA2: 20/25(J1)
OD_CYLINDER: -1.00
OS_VA2: 20/25(J1)
OS_SPHERE: +0.50
OD_AXIS: 095
OD_VA1: 20/25-2
OS_VA2: 20/25(J1)
OD_AXIS: 090
OS_AXIS: 065
OS_ADD: +2.75
OU_VA: 20/30-
OS_VA1: 20/125
OU_VA: 20/20-1
OD_VA1: 20/40
OD_SPHERE: +1.25
OS_SPHERE: +0.75
OS_ADD: +2.50
OD_ADD: +2.75
OD_CYLINDER: +1.00

## 2025-06-11 ASSESSMENT — PACHYMETRY
OD_CT_UM: 477
OS_CT_CORRECTION: 5
OD_CT_CORRECTION: 5
OS_CT_UM: 476

## 2025-06-11 ASSESSMENT — CONFRONTATIONAL VISUAL FIELD TEST (CVF)
OD_FINDINGS: FULL
OS_FINDINGS: FULL

## 2025-06-11 ASSESSMENT — REFRACTION_AUTOREFRACTION
OS_CYLINDER: -0.75
OD_SPHERE: +1.00
OS_AXIS: 104
OD_AXIS: 147
OS_SPHERE: +1.00
OD_CYLINDER: -0.75

## 2025-06-11 ASSESSMENT — KERATOMETRY
OS_K2POWER_DIOPTERS: 41.75
OD_AXISANGLE_DEGREES: 075
OS_AXISANGLE_DEGREES: 077
OD_K1POWER_DIOPTERS: 41.50
OD_K2POWER_DIOPTERS: 43.00
OS_K1POWER_DIOPTERS: 41.25

## 2025-06-11 ASSESSMENT — TONOMETRY
OS_IOP_MMHG: 17
OD_IOP_MMHG: 14
OD_IOP_MMHG: 16

## 2025-06-11 ASSESSMENT — VISUAL ACUITY
OS_BCVA: 20/25-2
OD_BCVA: 20/30-

## 2025-06-11 ASSESSMENT — CORNEAL EDEMA CLINICAL DESCRIPTION: OD_CORNEALEDEMA: T @ INCISION

## 2025-07-14 ENCOUNTER — RX ONLY (RX ONLY)
Age: 80
End: 2025-07-14

## 2025-07-14 ENCOUNTER — OFFICE (OUTPATIENT)
Dept: URBAN - METROPOLITAN AREA CLINIC 38 | Facility: CLINIC | Age: 80
Setting detail: OPHTHALMOLOGY
End: 2025-07-14
Payer: MEDICARE

## 2025-07-14 DIAGNOSIS — Z96.1: ICD-10-CM

## 2025-07-14 DIAGNOSIS — H52.4: ICD-10-CM

## 2025-07-14 PROBLEM — H16.223 DRY EYE SYNDROME K SICCA; BOTH EYES: Status: ACTIVE | Noted: 2025-07-14

## 2025-07-14 PROCEDURE — 92015 DETERMINE REFRACTIVE STATE: CPT | Performed by: OPHTHALMOLOGY

## 2025-07-14 PROCEDURE — 99024 POSTOP FOLLOW-UP VISIT: CPT | Performed by: OPHTHALMOLOGY

## 2025-07-14 ASSESSMENT — SUPERFICIAL PUNCTATE KERATITIS (SPK)
OD_SPK: ABSENT
OS_SPK: ABSENT

## 2025-07-14 ASSESSMENT — VISUAL ACUITY
OD_BCVA: 20/50
OS_BCVA: 20/25-2

## 2025-07-14 ASSESSMENT — CONFRONTATIONAL VISUAL FIELD TEST (CVF)
OD_FINDINGS: FULL
OS_FINDINGS: FULL

## 2025-07-14 ASSESSMENT — REFRACTION_MANIFEST
OD_SPHERE: PLANO
OD_SPHERE: PLANO
OD_ADD: +2.50
OD_VA2: 20/25(J1)
OS_SPHERE: +1.00
OS_ADD: +2.50
OD_ADD: +2.50
OS_VA1: 20/30-
OS_VA2: 20/25(J1)
OS_SPHERE: +0.75
OD_VA1: 20/30-
OD_CYLINDER: -0.25
OS_VA2: 20/25(J1)
OD_VA2: 20/25(J1)
OD_CYLINDER: -0.25
OD_AXIS: 150
OU_VA: 20/30-
OS_CYLINDER: -0.75
OS_VA1: 20/30-
OD_VA1: 20/30-
OS_ADD: +2.50
OS_AXIS: 103
OS_AXIS: 105
OD_AXIS: 150
OU_VA: 20/30-
OS_CYLINDER: -0.25

## 2025-07-14 ASSESSMENT — KERATOMETRY
OS_K1POWER_DIOPTERS: 41.50
OS_K2POWER_DIOPTERS: 41.75
OD_K1POWER_DIOPTERS: 42.00
OD_AXISANGLE_DEGREES: 080
OD_K2POWER_DIOPTERS: 43.25
OS_AXISANGLE_DEGREES: 038

## 2025-07-14 ASSESSMENT — PACHYMETRY
OS_CT_CORRECTION: 5
OS_CT_UM: 476
OD_CT_UM: 477
OD_CT_CORRECTION: 5

## 2025-07-14 ASSESSMENT — REFRACTION_CURRENTRX
OD_VPRISM_DIRECTION: PROGS
OS_OVR_VA: 20/
OD_SPHERE: +1.25
OD_CYLINDER: -1.00
OD_ADD: +2.50
OS_ADD: +2.50
OS_AXIS: 063
OD_OVR_VA: 20/
OS_VPRISM_DIRECTION: PROGS
OS_SPHERE: +0.75
OS_CYLINDER: -1.00
OD_AXIS: 090

## 2025-07-14 ASSESSMENT — TONOMETRY
OS_IOP_MMHG: 12
OD_IOP_MMHG: 10

## 2025-07-14 ASSESSMENT — TEAR BREAK UP TIME (TBUT)
OS_TBUT: 6-8 SEC
OD_TBUT: 6-8 SEC

## 2025-07-14 ASSESSMENT — REFRACTION_AUTOREFRACTION
OS_CYLINDER: -0.75
OS_SPHERE: +1.25
OS_AXIS: 103
OD_SPHERE: +0.50
OD_CYLINDER: -0.50
OD_AXIS: 149